# Patient Record
Sex: FEMALE | Race: WHITE
[De-identification: names, ages, dates, MRNs, and addresses within clinical notes are randomized per-mention and may not be internally consistent; named-entity substitution may affect disease eponyms.]

---

## 2017-05-02 ENCOUNTER — HOSPITAL ENCOUNTER (OUTPATIENT)
Dept: HOSPITAL 75 - RAD | Age: 52
End: 2017-05-02
Attending: FAMILY MEDICINE
Payer: COMMERCIAL

## 2017-05-02 DIAGNOSIS — Z12.31: Primary | ICD-10-CM

## 2017-05-02 PROCEDURE — 77067 SCR MAMMO BI INCL CAD: CPT

## 2017-05-03 NOTE — DIAGNOSTIC IMAGING REPORT
EXAMINATION:

Bilateral screening mammogram with a Computer Aided Detection

(CAD) system.



INDICATION: 

Screening.



PERSONAL HISTORY: 

No current complaints stated on the questionnaire.



COMPARISON: 

01/19/2016.



FINDINGS: 

The breasts are composed of scattered fibroglandular densities.

No suspicious calcification or mass is seen. Allowing for

technique and positional differences, no suspicious change is

seen.



IMPRESSION: 

No significant change.



ACR BI-RADS Category 2: Benign findings.

Result letter will be mailed to the patient.

Note: At least 10% of breast cancer is not imaged by mammography.



Dictated by: 



  Dictated on workstation # [id number]

## 2020-08-25 ENCOUNTER — HOSPITAL ENCOUNTER (OUTPATIENT)
Dept: HOSPITAL 75 - CARD | Age: 55
End: 2020-08-25
Attending: INTERNAL MEDICINE
Payer: COMMERCIAL

## 2020-08-25 VITALS — SYSTOLIC BLOOD PRESSURE: 157 MMHG | DIASTOLIC BLOOD PRESSURE: 87 MMHG

## 2020-08-25 VITALS — WEIGHT: 138.89 LBS | BODY MASS INDEX: 24.61 KG/M2 | HEIGHT: 62.99 IN

## 2020-08-25 DIAGNOSIS — Z95.5: ICD-10-CM

## 2020-08-25 DIAGNOSIS — I25.89: ICD-10-CM

## 2020-08-25 DIAGNOSIS — Z72.0: ICD-10-CM

## 2020-08-25 DIAGNOSIS — I65.23: ICD-10-CM

## 2020-08-25 DIAGNOSIS — I25.2: Primary | ICD-10-CM

## 2020-08-25 DIAGNOSIS — I25.10: ICD-10-CM

## 2020-08-25 DIAGNOSIS — I11.9: ICD-10-CM

## 2020-08-25 DIAGNOSIS — E78.5: ICD-10-CM

## 2020-08-25 PROCEDURE — 93017 CV STRESS TEST TRACING ONLY: CPT

## 2020-08-25 PROCEDURE — 78452 HT MUSCLE IMAGE SPECT MULT: CPT

## 2020-08-25 NOTE — STRESS TEST
DATE OF SERVICE:  08/25/2020



RESTING AND POST REGADENOSON TECHNETIUM-99M TETROFOSMIN SPET CT IMAGING



ORDERING PHYSICIAN:

Dr. Parra.



PRIMARY PHYSICIAN:

Dr. Linder.



CLINICAL DIAGNOSIS:

Coronary artery disease.



Baseline images were carried out after injection of 10.09 mCi of technetium-99m

Tetrofosmin.  This was followed by 0.4 mg regadenoson and 32.9 mCi of

technetium-99m Tetrofosmin for stress imaging.  The electrocardiogram showed

sinus rhythm at baseline.  The electrocardiogram did not change significantly

with regadenoson infusion.  The patient noted mild shortness of breath and a

headache following regadenoson infusion, which resolved in a few minutes. 

Review of images at rest and following stress indicates anteroapical perfusion

defect that is mostly fixed.  Gated images show well preserved global left

ventricular systolic function with a calculated ejection fraction of 52%.  There

is localized anteroapical hypokinesis to akinesis.  Left ventricular end

diastolic volume is 55 mL.  TID is absent (0.98).



CONCLUSIONS:

1.  This study indicates an anteroapical infarction with a small amount of

antonio-infarct ischemia.

2.  Localized anteroapical hypokinesis to akinesis.

3.  Well preserved global left ventricular systolic function with a calculated

ejection fraction of 52%.





Job ID: [id number]

DocumentID: [id number]

Dictated Date:  08/25/2020 15:11:26

Transcription Date: 08/25/2020 19:47:55

Dictated By: DANAY PARRA MD, MA, FACP, FACC,

## 2021-10-13 ENCOUNTER — HOSPITAL ENCOUNTER (OUTPATIENT)
Dept: HOSPITAL 75 - RAD | Age: 56
End: 2021-10-13
Attending: FAMILY MEDICINE
Payer: COMMERCIAL

## 2021-10-13 DIAGNOSIS — M79.662: Primary | ICD-10-CM

## 2021-10-13 NOTE — DIAGNOSTIC IMAGING REPORT
CLINICAL INDICATION: Patient with left calf pain.



COMPARISON: None.



PROCEDURE:

Real-time left lower extremity venous Doppler duplex evaluation

is performed from the inguinal region through the popliteal

fossa. The calf venous structures are also evaluated.



FINDINGS:

The deep venous system is well visualized and is easily

compressible.  There is no evidence of deep venous thrombosis,

valvular incompetence, or significant collateral circulation.



IMPRESSION:

There is no ultrasound Doppler evidence of deep venous thrombosis

in the left lower extremity.



Dictated by: 



  Dictated on workstation # [id number]

## 2022-04-19 ENCOUNTER — HOSPITAL ENCOUNTER (OUTPATIENT)
Dept: HOSPITAL 75 - ER | Age: 57
Setting detail: OBSERVATION
LOS: 1 days | Discharge: HOME | End: 2022-04-20
Attending: FAMILY MEDICINE | Admitting: FAMILY MEDICINE
Payer: COMMERCIAL

## 2022-04-19 VITALS — DIASTOLIC BLOOD PRESSURE: 86 MMHG | SYSTOLIC BLOOD PRESSURE: 139 MMHG

## 2022-04-19 VITALS — SYSTOLIC BLOOD PRESSURE: 127 MMHG | DIASTOLIC BLOOD PRESSURE: 82 MMHG

## 2022-04-19 VITALS — HEIGHT: 62.99 IN | WEIGHT: 139.33 LBS | BODY MASS INDEX: 24.69 KG/M2

## 2022-04-19 VITALS — DIASTOLIC BLOOD PRESSURE: 71 MMHG | SYSTOLIC BLOOD PRESSURE: 106 MMHG

## 2022-04-19 VITALS — SYSTOLIC BLOOD PRESSURE: 112 MMHG | DIASTOLIC BLOOD PRESSURE: 73 MMHG

## 2022-04-19 VITALS — DIASTOLIC BLOOD PRESSURE: 88 MMHG | SYSTOLIC BLOOD PRESSURE: 147 MMHG

## 2022-04-19 VITALS — SYSTOLIC BLOOD PRESSURE: 108 MMHG | DIASTOLIC BLOOD PRESSURE: 70 MMHG

## 2022-04-19 VITALS — SYSTOLIC BLOOD PRESSURE: 143 MMHG | DIASTOLIC BLOOD PRESSURE: 90 MMHG

## 2022-04-19 VITALS — DIASTOLIC BLOOD PRESSURE: 72 MMHG | SYSTOLIC BLOOD PRESSURE: 112 MMHG

## 2022-04-19 VITALS — DIASTOLIC BLOOD PRESSURE: 80 MMHG | SYSTOLIC BLOOD PRESSURE: 125 MMHG

## 2022-04-19 VITALS — DIASTOLIC BLOOD PRESSURE: 94 MMHG | SYSTOLIC BLOOD PRESSURE: 151 MMHG

## 2022-04-19 VITALS — DIASTOLIC BLOOD PRESSURE: 87 MMHG | SYSTOLIC BLOOD PRESSURE: 138 MMHG

## 2022-04-19 VITALS — DIASTOLIC BLOOD PRESSURE: 87 MMHG | SYSTOLIC BLOOD PRESSURE: 137 MMHG

## 2022-04-19 DIAGNOSIS — I25.10: ICD-10-CM

## 2022-04-19 DIAGNOSIS — Z79.82: ICD-10-CM

## 2022-04-19 DIAGNOSIS — Z79.899: ICD-10-CM

## 2022-04-19 DIAGNOSIS — R05.9: ICD-10-CM

## 2022-04-19 DIAGNOSIS — I10: ICD-10-CM

## 2022-04-19 DIAGNOSIS — Z95.5: ICD-10-CM

## 2022-04-19 DIAGNOSIS — I21.4: ICD-10-CM

## 2022-04-19 DIAGNOSIS — F41.9: ICD-10-CM

## 2022-04-19 DIAGNOSIS — F17.210: ICD-10-CM

## 2022-04-19 DIAGNOSIS — R07.89: Primary | ICD-10-CM

## 2022-04-19 DIAGNOSIS — I65.29: ICD-10-CM

## 2022-04-19 DIAGNOSIS — Z86.16: ICD-10-CM

## 2022-04-19 DIAGNOSIS — Z79.01: ICD-10-CM

## 2022-04-19 DIAGNOSIS — E78.5: ICD-10-CM

## 2022-04-19 LAB
ALBUMIN SERPL-MCNC: 4.3 GM/DL (ref 3.2–4.5)
ALP SERPL-CCNC: 124 U/L (ref 40–136)
ALT SERPL-CCNC: 18 U/L (ref 0–55)
APTT BLD: 32 SEC (ref 24–35)
BASOPHILS # BLD AUTO: 0 10^3/UL (ref 0–0.1)
BASOPHILS NFR BLD AUTO: 0 % (ref 0–10)
BILIRUB SERPL-MCNC: 0.4 MG/DL (ref 0.1–1)
BUN/CREAT SERPL: 12
CALCIUM SERPL-MCNC: 10 MG/DL (ref 8.5–10.1)
CHLORIDE SERPL-SCNC: 108 MMOL/L (ref 98–107)
CO2 SERPL-SCNC: 20 MMOL/L (ref 21–32)
CREAT SERPL-MCNC: 0.75 MG/DL (ref 0.6–1.3)
EOSINOPHIL # BLD AUTO: 0.2 10^3/UL (ref 0–0.3)
EOSINOPHIL NFR BLD AUTO: 2 % (ref 0–10)
GFR SERPLBLD BASED ON 1.73 SQ M-ARVRAT: 93 ML/MIN
GLUCOSE SERPL-MCNC: 102 MG/DL (ref 70–105)
HCT VFR BLD CALC: 40 % (ref 35–52)
HGB BLD-MCNC: 13.6 G/DL (ref 11.5–16)
INR PPP: 0.9 (ref 0.8–1.4)
LYMPHOCYTES # BLD AUTO: 1.9 10^3/UL (ref 1–4)
LYMPHOCYTES NFR BLD AUTO: 28 % (ref 12–44)
MAGNESIUM SERPL-MCNC: 2 MG/DL (ref 1.6–2.4)
MANUAL DIFFERENTIAL PERFORMED BLD QL: NO
MCH RBC QN AUTO: 33 PG (ref 25–34)
MCHC RBC AUTO-ENTMCNC: 34 G/DL (ref 32–36)
MCV RBC AUTO: 98 FL (ref 80–99)
MONOCYTES # BLD AUTO: 0.6 10^3/UL (ref 0–1)
MONOCYTES NFR BLD AUTO: 9 % (ref 0–12)
NEUTROPHILS # BLD AUTO: 4.2 10^3/UL (ref 1.8–7.8)
NEUTROPHILS NFR BLD AUTO: 61 % (ref 42–75)
PLATELET # BLD: 279 10^3/UL (ref 130–400)
PMV BLD AUTO: 9.6 FL (ref 9–12.2)
POTASSIUM SERPL-SCNC: 4.1 MMOL/L (ref 3.6–5)
PROT SERPL-MCNC: 7.5 GM/DL (ref 6.4–8.2)
PROTHROMBIN TIME: 12.6 SEC (ref 12.2–14.7)
SODIUM SERPL-SCNC: 142 MMOL/L (ref 135–145)
WBC # BLD AUTO: 7 10^3/UL (ref 4.3–11)

## 2022-04-19 PROCEDURE — 85025 COMPLETE CBC W/AUTO DIFF WBC: CPT

## 2022-04-19 PROCEDURE — 36415 COLL VENOUS BLD VENIPUNCTURE: CPT

## 2022-04-19 PROCEDURE — 93041 RHYTHM ECG TRACING: CPT

## 2022-04-19 PROCEDURE — 71275 CT ANGIOGRAPHY CHEST: CPT

## 2022-04-19 PROCEDURE — 96375 TX/PRO/DX INJ NEW DRUG ADDON: CPT

## 2022-04-19 PROCEDURE — 96361 HYDRATE IV INFUSION ADD-ON: CPT

## 2022-04-19 PROCEDURE — 85610 PROTHROMBIN TIME: CPT

## 2022-04-19 PROCEDURE — 84484 ASSAY OF TROPONIN QUANT: CPT

## 2022-04-19 PROCEDURE — 99284 EMERGENCY DEPT VISIT MOD MDM: CPT

## 2022-04-19 PROCEDURE — 80061 LIPID PANEL: CPT

## 2022-04-19 PROCEDURE — 80053 COMPREHEN METABOLIC PANEL: CPT

## 2022-04-19 PROCEDURE — 93005 ELECTROCARDIOGRAM TRACING: CPT

## 2022-04-19 PROCEDURE — 71045 X-RAY EXAM CHEST 1 VIEW: CPT

## 2022-04-19 PROCEDURE — 93306 TTE W/DOPPLER COMPLETE: CPT

## 2022-04-19 PROCEDURE — 85730 THROMBOPLASTIN TIME PARTIAL: CPT

## 2022-04-19 PROCEDURE — 83735 ASSAY OF MAGNESIUM: CPT

## 2022-04-19 PROCEDURE — 96374 THER/PROPH/DIAG INJ IV PUSH: CPT

## 2022-04-19 PROCEDURE — 85379 FIBRIN DEGRADATION QUANT: CPT

## 2022-04-19 PROCEDURE — 96360 HYDRATION IV INFUSION INIT: CPT

## 2022-04-19 PROCEDURE — 83880 ASSAY OF NATRIURETIC PEPTIDE: CPT

## 2022-04-19 PROCEDURE — 83874 ASSAY OF MYOGLOBIN: CPT

## 2022-04-19 PROCEDURE — 96376 TX/PRO/DX INJ SAME DRUG ADON: CPT

## 2022-04-19 RX ADMIN — SODIUM CHLORIDE, SODIUM LACTATE, POTASSIUM CHLORIDE, AND CALCIUM CHLORIDE SCH MLS/HR: 600; 310; 30; 20 INJECTION, SOLUTION INTRAVENOUS at 17:37

## 2022-04-19 NOTE — DIAGNOSTIC IMAGING REPORT
INDICATION: Chest pain.



COMPARISON: 11/05/2019.



FINDINGS: Single frontal view of the chest demonstrates normal

heart size and pulmonary vascularity. The lungs are well aerated

and clear. No large pleural effusion or pneumothorax is seen. The

visualized osseous structures show no acute abnormalities.



IMPRESSION: 

1. No acute cardiopulmonary process.  



Dictated by: 



  Dictated on workstation # [id number]

## 2022-04-19 NOTE — CONSULTATION-CARDIOLOGY
HPI-Cardiology


Cardiology Consultation:


Date of Consultation


22


Time Seen by a Provider:  16:20


Date of Admission





Attending Physician


Hayley Linder DO


Admitting Physician


Hayley Linder DO


Consulting Physician


DANAY PARDO MD, MA, FACP, FACC, FSCAI, CCDS





HPI:


Chief Complaint:


Chest pain


Ms. Bravo is a 57 yr old female who is being admitted to [facility] from the ED with 

c/o CP.  She reports this morning she leaned over in her chair to  a 

cheese stick off the floor at work and when she sat back up she had localized, 

left sided chest chest pressure.  No c/o SOB, diaphoresis, palpitations, syncope

or near syncope.  She reports the discomfort has been constant all day.  Nothing

makes it worse.  She reports she received one nitro sublingual which had very 

little, if any, change in the discomfort.  She reports she has been compliant 

with her medications.  No c/o LE swelling.  No c/o n/v/d.





Review of Systems-Cardiology


Review of Systems


Constitutional:  No chills, No fever, No lightheadedness, No malaise


Eyes:  No vision change


Ears/Nose/Throat:  No epistaxis, No recent hearing loss


Respiratory:  As described under HPI


Cardiovascular:  As described under HPI


Gastrointestinal:  As described under HPI


Genitourinary:  No dysuria, No hematuria


Musculoskeletal:  no symptoms reported


Skin:  No rash on exposed areas, No ulcerations on exposed areas


Psychiatric/Neurological:  No anxiety, No depression, No seizure, No focal 

weakness, No syncope


Hematologic:  No bleeding abnormalities





PMH-Social-Family Hx


Patient Social History


Smoking Status:  Current Everyday Smoker


2nd Hand Smoke Exposure:  Yes


Have you traveled recently?:  No


Alcohol Use?:  Yes


Pt feels they are or have been:  No


Tobacco type used:  Cigarettes





Past Medical History


PMH


As described under Assessment.





Family Medical History


Family Medical History:  


Reported family h/o mother having CAD, diagnosed in her 50's.  She reports


a brother with a-fib.  She reports a sister with HTN.


Family History:  


Cardiovascular disease


  19 MOTHER, Onset:50's - 60


FH: atrial fibrillation


  G8 BROTHER, Onset:Unknown


Glaucoma


Hypercholesterolemia


  G8 SISTER, Onset:Unknown


Hypertension


  G8 SISTER, Onset:Unknown


Kidney disease


  19 FATHER, , Onset:60 years & older





Allergies and Home Medications


Allergies


Coded Allergies:  


     Sulfa (Sulfonamide Antibiotics) (Unverified  Allergy, Unknown, 1/14/15)





Patient Home Medication List


Home Medication List Reviewed:  Yes


Aspirin (Aspirin) 81 Mg Tab.chew, 81 MG PO DAILY


   Prescribed by: HAYLEY LINDER on 19 1232


Atorvastatin Calcium (Atorvastatin Calcium) 80 Mg Tablet, 80 MG PO HS


   Prescribed by: HAYLEY LINDER on 19 1232


Cetirizine HCl (Zyrtec) 10 Mg Tablet, 10 MG PO DAILY PRN for ALLERGIES, 

(Reported)


   Entered as Reported by: NIDHI WADDELL on 19 1030


Clopidogrel Bisulfate (Clopidogrel) 75 Mg Tablet, 75 MG PO DAILY


   Prescribed by: HAYLEY LINDER on 19 1232


Famotidine (Famotidine) 20 Mg Tablet, 20 MG PO BID


   Prescribed by: HAYLEY LINDER on 19 1232


Metoprolol Succinate (Metoprolol Succinate) 50 Mg Tab.er.24h, 50 MG PO DAILY


   Prescribed by: HAYLEY LINDER on 19 1232


Nitrofurantoin Monohyd/M-Cryst (Macrobid 100 mg Capsule) 100 Mg Capsule, 1 TAB 

PO BID


   Prescribed by: FERN HERRERA on 19 1624





Physical Exam-Cardiology


Physical Exam


Vital Signs/I&O











 22





 13:13 15:59 16:05


 


Temp 36.9  36.4


 


Pulse 67 56 59


 


Resp 18 18 18


 


B/P (MAP) 147/89 (108) 128/73 138/87 (104)


 


Pulse Ox 97 98 98


 


O2 Delivery   Room Air





Capillary Refill : Less Than 3 Seconds


Constitutional:  AAO x 3, well-developed, well-nourished


HEENT:  PERRL, hearing is well preserved, oral hygience is good


Neck:  No carotid bruit; carotid pulses are 2 + bilaterally


Respiratory:  No accessory muscle use, No respiratory distress; chest expansion 

is symmetric, chest is bilaterally symmetric, lungs clear to auscultation


Cardiovascular:  regular rate-rhythm; No JVD; S1 and S2


Gastrointestinal:  No tender; soft, round


Extremities:  no lower extremity edema bilateral


Neurologic/Psychiatric:  grossly intact (moves all extremities)


Skin:  No rash on exposed areas, No ulcerations on exposed areas





Data Review


Labs


Laboratory Tests


22 13:39: 


White Blood Count 7.0, Red Blood Count 4.10, Hemoglobin 13.6, Hematocrit 40, 

Mean Corpuscular Volume 98, Mean Corpuscular Hemoglobin 33, Mean Corpuscular 

Hemoglobin Concent 34, Red Cell Distribution Width 12.4, Platelet Count 279, 

Mean Platelet Volume 9.6, Immature Granulocyte % (Auto) 0, Neutrophils (%) 

(Auto) 61, Lymphocytes (%) (Auto) 28, Monocytes (%) (Auto) 9, Eosinophils (%) 

(Auto) 2, Basophils (%) (Auto) 0, Neutrophils # (Auto) 4.2, Lymphocytes # (Auto)

1.9, Monocytes # (Auto) 0.6, Eosinophils # (Auto) 0.2, Basophils # (Auto) 0.0, 

Immature Granulocyte # (Auto) 0.0, Prothrombin Time 12.6, INR Comment 0.9, 

Activated Partial Thromboplast Time 32, D-Dimer 1.73H, Sodium Level 142, 

Potassium Level 4.1, Chloride Level 108H, Carbon Dioxide Level 20L, Anion Gap 

14, Blood Urea Nitrogen 9, Creatinine 0.75, Estimat Glomerular Filtration Rate 

93, BUN/Creatinine Ratio 12, Glucose Level 102, Calcium Level 10.0, Corrected 

Calcium 9.8, Magnesium Level 2.0, Total Bilirubin 0.4, Aspartate Amino Transf 

(AST/SGOT) 22, Alanine Aminotransferase (ALT/SGPT) 18, Alkaline Phosphatase 124,

Myoglobin 20.2, Troponin I < 0.028, B-Type Natriuretic Peptide 124.1H, Total 

Protein 7.5, Albumin 4.3








A/P-Cardiology


Assessment/Admission Diagnosis


Chest pain 


- undetermined etiology





CAD.


- Ac NSTEMI, treated with primary PCI in the late hours of 19.


- Card cath and PCI of 19: Coronary artery disease consisting of proximal 

occlusion of a small caliber high diagonal branch of the left anterior 

descending to which successful balloon angioplasty was carried out with Emerge 

1.5 x 8 mm balloon and which restored normal antegrade flow. The left anterior 

descending had 70% mid vessel stenosis to which successful stenting was carried 

out with [place] 2.0 x 15 mm stent with good results. The rest of the coronary 

vessels have mild plaques. Elevated left ventricular end-diastolic pressure. 

Well preserved global left ventricular systolic function with an ejection 

fraction of approximately 60%.


- MPI of Aug 25, 2020 indicated an anteroapical infarction with a small amt of p

alyssa-infarct ischemia which fits with her known CAD, no c/o CP at this time, 

localized anteroapical hypokinesis to akinesis. LVEF 52%





Chronic tobacco use


- cessation advised





Strong fam h/o premature CAD





Hypertension





Hyperlipidemia


- tansaminase elevation with doses higher than atorvastatin 40 mg daily) - 

followed by Dr. Linder





Carotid dz


- minimal on carotid u/s of 2020





Discussion and Recomendations


Chest pain of undetermined etiology


- no evidence of ACS thus far


- continue serial troponins


- MPI tomorrow d/t c/o and known h/o CAD with previous stent placement


- continue Plavix and ASA


Continue home medications


Monitor lab


- replace electrolytes as indicated


Add PPI


Further recs will be based on her hospital course


We would like to thank medical services for this consult





Clinical Quality Measures


AMI/AHF:


ASA po Prior to arrival:  Yes (81MG ASPIRIN AT HOME)











DANAY PARDO MD FACP FACC CCDS   2022 16:37

## 2022-04-19 NOTE — DIAGNOSTIC IMAGING REPORT
EXAMINATION: CT angiography of the chest.



TECHNIQUE: Contrast enhanced thin section helical images were

obtained through the chest with intravenous contrast timed for

the optimal opacification of the arterial structures per CTA

protocol. Post-processing, reconstructions and interpretation of

angiographic images of the vessels was performed. 3D MIP

reconstructions were performed and reviewed. All CT scans use one

or more of the following dose optimizing techniques: Automated

exposure control, MA and/or KvP adjustment based on a patient

size and exam type, or iterative reconstruction. 



HISTORY: Chest pain and elevated D-dimer.



COMPARISON: None available.



FINDINGS: 



There is no pulmonary embolism.



There is no edema or pneumonia. No pleural effusion. No

pneumothorax. No suspicious nodules.



There is no axillary or supraclavicular lymphadenopathy. There is

no mediastinal lymphadenopathy. Heart size is normal. There are

mild coronary artery calcifications. No pericardial effusion.

Aorta is normal in caliber.



Limited views of the upper abdomen are unremarkable.



There are no suspicious osseous lesions.



IMPRESSION:



1. No pulmonary embolism, clear lungs.



Dictated by: 



  Dictated on workstation # [id number]

## 2022-04-19 NOTE — CONSULTATION-CARDIOLOGY
HPI-Cardiology


Cardiology Consultation:


Date of Consultation


22


Time Seen by a Provider:  15:30


Date of Admission


22


Attending Physician





Admitting Physician


Hayley Linder DO


Consulting Physician


Faye Parra MD





HPI:


Chief Complaint:


Chest pain


Ms. Bravo is a 57 yr old female who is being admitted to [facility] from the ED with 

c/o CP.  She reports this morning she leaned over in her chair to  a 

cheese stick off the floor at work and when she sat back up she had localized, 

left sided chest chest pressure.  No c/o SOB, diaphoresis, palpitations, syncope

or near syncope.  She reports the discomfort has been constant all day.  Nothing

makes it worse.  She reports she received one nitro sublingual which had very 

little, if any, change in the discomfort.  She reports she has been compliant 

with her medications.  No c/o LE swelling.  No c/o n/v/d.





Review of Systems-Cardiology


Review of Systems


Constitutional:  No chills, No fever, No lightheadedness, No malaise


Eyes:  No vision change


Ears/Nose/Throat:  No epistaxis, No recent hearing loss


Respiratory:  As described under HPI


Cardiovascular:  As described under HPI


Gastrointestinal:  As described under HPI


Genitourinary:  No dysuria, No hematuria


Musculoskeletal:  no symptoms reported


Skin:  No rash on exposed areas, No ulcerations on exposed areas


Psychiatric/Neurological:  No anxiety, No depression, No seizure, No focal 

weakness, No syncope


Hematologic:  No bleeding abnormalities





PMH-Social-Family Hx


Patient Social History


Smoking Status:  Current Everyday Smoker


2nd Hand Smoke Exposure:  Yes


Have you traveled recently?:  No


Alcohol Use?:  Yes


Pt feels they are or have been:  No


Tobacco type used:  Cigarettes





Past Medical History


PMH


As described under Assessment.





Family Medical History


Family Medical History:  


Reported family h/o mother having CAD, diagnosed in her 50's.  She reports


a brother with a-fib.  She reports a sister with HTN.


Family History:  


19 FATHER, 


  Kidney disease, Onset:60 years & older


19 MOTHER


  Cardiovascular disease, Onset:50's - 60


G8 BROTHER


  FH: atrial fibrillation, Onset:Unknown


G8 SISTER


  Hypercholesterolemia, Onset:Unknown


  Hypertension, Onset:Unknown


Relation not specified for:


  Glaucoma





Allergies and Home Medications


Allergies


Coded Allergies:  


     Sulfa (Sulfonamide Antibiotics) (Unverified  Allergy, Unknown, 1/14/15)


     nitrofurantoin (Verified  Allergy, Unknown, 22)


   rash over torso





Patient Home Medication List


Amlodipine Besylate (Norvasc) 5 Mg Tablet, 5 MG PO DAILY, (Reported)


   Entered as Reported by: Jane Alejandre on 22


   Last Action: Reviewed


Aspirin (Aspirin) 81 Mg Tab.chew, 81 MG PO DAILY


   Prescribed by: HAYLEY LINDER on 19 123


   Last Action: Reviewed


Atorvastatin Calcium (Atorvastatin Calcium) 80 Mg Tablet, 80 MG PO HS


   Prescribed by: HAYLEY LINDER on 19 123


   Last Action: Continued


Cetirizine HCl (Zyrtec) 10 Mg Tablet, 10 MG PO DAILY PRN for ALLERGIES, 

(Reported)


   Entered as Reported by: NIDHI WADDELL on 19 1030


   Last Action: Reviewed


Clopidogrel Bisulfate (Clopidogrel) 75 Mg Tablet, 75 MG PO DAILY


   Prescribed by: HAYLEY LINDER on 19 123


   Last Action: Reviewed


Famotidine (Famotidine) 20 Mg Tablet, 20 MG PO BID


   Prescribed by: HAYLEY LINDER on 19 123


   Last Action: Reviewed


Metoprolol Succinate (Metoprolol Succinate) 50 Mg Tab.er.24h, 50 MG PO DAILY


   Prescribed by: HAYLEY LINDER on 19 123


   Last Action: Continued


Paroxetine HCl (Paroxetine HCl) 20 Mg Tablet, 20 MG PO DAILY, (Reported)


   Entered as Reported by: Jane Alejandre on 22 163


   Last Action: Continued


Discontinued Medications


Nitrofurantoin Monohyd/M-Cryst (Macrobid 100 mg Capsule) 100 Mg Capsule, 1 TAB 

PO BID


   Discontinued Reason: No Longer Taking


   Prescribed by: FERN HERRERA on 19 162


   Last Action: Discontinued





Physical Exam-Cardiology


Physical Exam


Vital Signs/I&O











 22





 22:05 23:10 01:08 03:25


 


Temp  36.5  36.3


 


Pulse 64 69 61 53


 


Resp  20  20


 


B/P (MAP) 106/71 (83) 108/70 (83)  113/69 (84)


 


Pulse Ox  95  96


 


O2 Delivery  Room Air  Room Air


 


    





 22 





 07:00 08:02 08:15 


 


Pulse 61 55  


 


Resp  16  


 


B/P (MAP)  129/80 (96)  


 


Pulse Ox  97  


 


O2 Delivery  Room Air Room Air 














 22





 00:00


 


Intake Total 200 ml


 


Balance 200 ml





Capillary Refill : Less Than 3 Seconds


Constitutional:  AAO x 3, well-developed, well-nourished


HEENT:  PERRL, hearing is well preserved, oral hygience is good


Neck:  No carotid bruit; carotid pulses are 2 + bilaterally


Respiratory:  No accessory muscle use, No respiratory distress; chest expansion 

is symmetric, chest is bilaterally symmetric, lungs clear to auscultation


Cardiovascular:  regular rate-rhythm; No JVD; S1 and S2


Gastrointestinal:  No tender; soft, round


Extremities:  no lower extremity edema bilateral


Neurologic/Psychiatric:  grossly intact (moves all extremities)


Skin:  No rash on exposed areas, No ulcerations on exposed areas





Data Review


Labs


Laboratory Tests


22 13:39: 


White Blood Count 7.0, Red Blood Count 4.10, Hemoglobin 13.6, Hematocrit 40, 

Mean Corpuscular Volume 98, Mean Corpuscular Hemoglobin 33, Mean Corpuscular 

Hemoglobin Concent 34, Red Cell Distribution Width 12.4, Platelet Count 279, 

Mean Platelet Volume 9.6, Immature Granulocyte % (Auto) 0, Neutrophils (%) 

(Auto) 61, Lymphocytes (%) (Auto) 28, Monocytes (%) (Auto) 9, Eosinophils (%) 

(Auto) 2, Basophils (%) (Auto) 0, Neutrophils # (Auto) 4.2, Lymphocytes # (Auto)

1.9, Monocytes # (Auto) 0.6, Eosinophils # (Auto) 0.2, Basophils # (Auto) 0.0, 

Immature Granulocyte # (Auto) 0.0, Prothrombin Time 12.6, INR Comment 0.9, 

Activated Partial Thromboplast Time 32, D-Dimer 1.73H, Sodium Level 142, 

Potassium Level 4.1, Chloride Level 108H, Carbon Dioxide Level 20L, Anion Gap 

14, Blood Urea Nitrogen 9, Creatinine 0.75, Estimat Glomerular Filtration Rate 

93, BUN/Creatinine Ratio 12, Glucose Level 102, Calcium Level 10.0, Corrected 

Calcium 9.8, Magnesium Level 2.0, Total Bilirubin 0.4, Aspartate Amino Transf 

(AST/SGOT) 22, Alanine Aminotransferase (ALT/SGPT) 18, Alkaline Phosphatase 124,

Myoglobin 20.2, Troponin I < 0.028, B-Type Natriuretic Peptide 124.1H, Total 

Protein 7.5, Albumin 4.3


22 17:18: Troponin I < 0.028


22 05:26: 


Troponin I < 0.028, Triglycerides Level 149, Cholesterol Level 178, LDL 

Cholesterol Direct 122, VLDL Cholesterol 30, HDL Cholesterol 36L








Radiology


NAME:   ANIBAL BRAVO


[facility] REC#:   [id number]


ACCOUNT#:   [id number]


PT STATUS:   REG ER


:   1965


PHYSICIAN:   JOSE MASTERSON


ADMIT DATE:   22/ER


***Draft***


Date of Exam:22





CHEST 1 VIEW, AP/PA ONLY








INDICATION: Chest pain.





COMPARISON: 2019.





FINDINGS: Single frontal view of the chest demonstrates normal


heart size and pulmonary vascularity. The lungs are well aerated


and clear. No large pleural effusion or pneumothorax is seen. The


visualized osseous structures show no acute abnormalities.





IMPRESSION: 


1. No acute cardiopulmonary process.  





  Dictated on workstation # [id number]








Dict:   22 [id number]


Trans:   22 1417


 [id number]





Interpreted by:     JACQUELIN LUIS MD


Electronically signed by:  


NAME:   ANIBAL BRAVO


[facility] REC#:   [id number]


ACCOUNT#:   [id number]


PT STATUS:   REG ER


:   1965


PHYSICIAN:   JOSE MASTERSON


ADMIT DATE:   22/ER


***Draft***


Date of Exam:22





CT ANGIO CHEST W








EXAMINATION: CT angiography of the chest.





TECHNIQUE: Contrast enhanced thin section helical images were


obtained through the chest with intravenous contrast timed for


the optimal opacification of the arterial structures per CTA


protocol. Post-processing, reconstructions and interpretation of


angiographic images of the vessels was performed. 3D MIP


reconstructions were performed and reviewed. All CT scans use one


or more of the following dose optimizing techniques: Automated


exposure control, MA and/or KvP adjustment based on a patient


size and exam type, or iterative reconstruction. 





HISTORY: Chest pain and elevated D-dimer.





COMPARISON: None available.





FINDINGS: 





There is no pulmonary embolism.





There is no edema or pneumonia. No pleural effusion. No


pneumothorax. No suspicious nodules.





There is no axillary or supraclavicular lymphadenopathy. There is


no mediastinal lymphadenopathy. Heart size is normal. There are


mild coronary artery calcifications. No pericardial effusion.


Aorta is normal in caliber.





Limited views of the upper abdomen are unremarkable.





There are no suspicious osseous lesions.





IMPRESSION:





1. No pulmonary embolism, clear lungs.





  Dictated on workstation # [id number]








Dict:   22 1449


Trans:   22 1453


 [id number]





Interpreted by:     MARCELO IVORY MD


Electronically signed by:





ECG Impression


ECG


Initial ECG Rhythm:  Normal Sinus





A/P-Cardiology


Assessment/Admission Diagnosis


Chest pain 


- undetermined etiology





CAD.


- Ac NSTEMI, treated with primary PCI in the late hours of 19.


- Card cath and PCI of 19: Coronary artery disease consisting of proximal 

occlusion of a small caliber high diagonal branch of the left anterior sussy

cending to which successful balloon angioplasty was carried out with Emerge 1.5 

x 8 mm balloon and which restored normal antegrade flow. The left anterior 

descending had 70% mid vessel stenosis to which successful stenting was carried 

out with [place] 2.0 x 15 mm stent with good results. The rest of the coronary 

vessels have mild plaques. Elevated left ventricular end-diastolic pressure. 

Well preserved global left ventricular systolic function with an ejection 

fraction of approximately 60%.


- MPI of Aug 25, 2020 indicated an anteroapical infarction with a small amt of 

antonio-infarct ischemia which fits with her known CAD, no c/o CP at this time, 

localized anteroapical hypokinesis to akinesis. LVEF 52%





Chronic tobacco use


- cessation advised





Strong fam h/o premature CAD





Hypertension





Hyperlipidemia


- tansaminase elevation with doses higher than atorvastatin 40 mg daily) - 

followed by Dr. Linder





Carotid dz


- minimal on carotid u/s of 2020





Discussion and Recomendations


Chest pain of undetermined etiology


- no evidence of ACS thus far


- continue serial troponins


- MPI tomorrow d/t c/o and known h/o CAD with previous stent placement


- continue Plavix and ASA


Continue home medications


Monitor lab


- replace electrolytes as indicated


Add PPI


Further recs will be based on her hospital course


We would like to thank medical services for this consult





Clinical Quality Measures


AMI/AHF:


ASA po Prior to arrival:  Yes (81MG ASPIRIN AT HOME)











JED ZAMBRANO           2022 15:21

## 2022-04-19 NOTE — ED CHEST PAIN
General


Chief Complaint:  Chest Pain


Stated Complaint:  CP


Source:  patient


Exam Limitations:  no limitations





History of Present Illness


Date Seen by Provider:  2022


Time Seen by Provider:  13:33


Initial Comments


PT ARRIVED BY PRIVATE VEHICLE WITH CHIEF COMPLAINT OF CHEST PAIN. PT WAS ALERT, 

ORIENTED X 4 AND AMBULATORY ON ARRIVAL. PT STATED ONSET WAS 1230 WHEN SHE WAS 

BENDING DOWN TO GET SOMETHING AND IT STARTED. NOTHING MAKES IT BETTER OR WORSE. 

PT HAS TAKEN 81 MG OF ASPIRIN. PT STATED PAIN IS STEADY ON LEFT SIDE OF CHEST 

WITH A SEVERITY OF 3/10 ON PAIN SCALE. PT DENIES ANY RADIATION TO BACK, JAW OR 

ARM. PT STATED SHE HAS HISTORY OF A HEART ATTACK AND PREVIOUSLY HAD A STENT 

PLACED. PT TAKES PLAVIX EVERY OTHER DAY. PT HAD COVID IN 2022 AND HAS HAD A 

COUGH SINCE, BUT NO NEW COUGH OR PRODUCTION. PT DENIES FEVER, CHILLS, OR SOB.


Location Injury Occurred:  LEFT SIDE CHEST PAIN


Timing/Duration:  1 hour


Severity/Quality:  mild


Location:  central


Radiation:  no radiation


Activities at Onset:  other (BENDING OVER TO GRAB SOMETHING UNDERNEATH HER DESK)


Prior CP/Workup:  cardiac cath, heart attack


ASA po PTA:  Yes (81MG ASPIRIN AT HOME)


NTG SL PTA:  No


Associated Symptoms:  No fever/chills, No shortness of breath





Allergies and Home Medications


Allergies


Coded Allergies:  


     Sulfa (Sulfonamide Antibiotics) (Unverified  Allergy, Unknown, 1/14/15)





Patient Home Medication List


Home Medication List Reviewed:  Yes


Aspirin (Aspirin) 81 Mg Tab.chew, 81 MG PO DAILY


   Prescribed by: HAYLEY HICKEY on 19 1232


Atorvastatin Calcium (Atorvastatin Calcium) 80 Mg Tablet, 80 MG PO HS


   Prescribed by: HAYLEY HICKEY on 19 1232


Cetirizine HCl (Zyrtec) 10 Mg Tablet, 10 MG PO DAILY PRN for ALLERGIES, 

(Reported)


   Entered as Reported by: NIDHI WADDELL on 19 1030


Clopidogrel Bisulfate (Clopidogrel) 75 Mg Tablet, 75 MG PO DAILY


   Prescribed by: HAYLEY HICKEY on 19 1232


Famotidine (Famotidine) 20 Mg Tablet, 20 MG PO BID


   Prescribed by: HAYLEY HICKEY on 19 1232


Metoprolol Succinate (Metoprolol Succinate) 50 Mg Tab.er.24h, 50 MG PO DAILY


   Prescribed by: HAYLEY HICKEY on 19 1232


Nitrofurantoin Monohyd/M-Cryst (Macrobid 100 mg Capsule) 100 Mg Capsule, 1 TAB 

PO BID


   Prescribed by: FERN HERRERA on 19 1624





Review of Systems


Review of Systems


Constitutional:  see HPI; No fever


EENTM:  No Symptoms Reported, See HPI


Respiratory:  See HPI, Cough


Cardiovascular:  See HPI, Chest Pain


Gastrointestinal:  No Symptoms Reported, See HPI


Genitourinary:  No Symptoms Reported, See HPI


Musculoskeletal:  no symptoms reported, see HPI


Skin:  no symptoms reported, see HPI


Psychiatric/Neurological:  No Symptoms Reported, See HPI


Endocrine:  No Symptoms Reported, See HPI


Hematologic/Lymphatic:  No Symptoms Reported, See HPI





Past Medical-Social-Family Hx


Seasonal Allergies


Seasonal Allergies:  Yes





Past Medical History


Surgeries:  Yes


Appendectomy,  Section, Hysterectomy, Oophorectomy, Orthopedic, 

Tonsillectomy


Respiratory:  No


Cardiac:  Yes


High Cholesterol, Hypertension


Neurological:  No


GYN History:  Hysterectomy


Gastrointestinal:  Yes (COLONOSCOPY )


Polyps


Musculoskeletal:  No


Endocrine:  No


Cancer:  No


Psychosocial:  Yes


Anxiety


Integumentary:  No


Blood Disorders:  No





Family Medical History





Cardiovascular disease


  19 MOTHER, Onset:50's - 60


FH: atrial fibrillation


  G8 BROTHER, Onset:Unknown


Glaucoma


Hypercholesterolemia


  G8 SISTER, Onset:Unknown


Hypertension


  G8 SISTER, Onset:Unknown


Kidney disease


  19 FATHER, , Onset:60 years & older





Physical Exam


Vital Signs





Vital Signs - First Documented








 22





 13:13


 


Temp 36.9


 


Pulse 67


 


Resp 18


 


B/P (MAP) 147/89 (108)


 


Pulse Ox 97





Capillary Refill :


Height, Weight, BMI


Height: 5'3"


Weight: 140lbs. oz. 63.799551ay; 24.60 BMI


Method:Stated


General Appearance:  No Apparent Distress


Neck:  Full Range of Motion, Normal Inspection


Respiratory:  Lungs Clear, Normal Breath Sounds, No Accessory Muscle Use


Cardiovascular:  Regular Rate, Rhythm, No Gallop, No Murmur


Gastrointestinal:  Normal Bowel Sounds


Extremity:  Normal Capillary Refill, Normal Range of Motion


Neurologic/Psychiatric:  Alert, Oriented x3, Normal Mood/Affect


Skin:  Normal Color, Warm/Dry





Progress/Results/Core Measures


Results/Orders


Lab Results





Laboratory Tests








Test


 22


13:39 Range/Units


 


 


White Blood Count


 7.0 


 4.3-11.0


10^3/uL


 


Red Blood Count


 4.10 


 3.80-5.11


10^6/uL


 


Hemoglobin 13.6  11.5-16.0  g/dL


 


Hematocrit 40  35-52  %


 


Mean Corpuscular Volume 98  80-99  fL


 


Mean Corpuscular Hemoglobin 33  25-34  pg


 


Mean Corpuscular Hemoglobin


Concent 34 


 32-36  g/dL





 


Red Cell Distribution Width 12.4  10.0-14.5  %


 


Platelet Count


 279 


 130-400


10^3/uL


 


Mean Platelet Volume 9.6  9.0-12.2  fL


 


Immature Granulocyte % (Auto) 0   %


 


Neutrophils (%) (Auto) 61  42-75  %


 


Lymphocytes (%) (Auto) 28  12-44  %


 


Monocytes (%) (Auto) 9  0-12  %


 


Eosinophils (%) (Auto) 2  0-10  %


 


Basophils (%) (Auto) 0  0-10  %


 


Neutrophils # (Auto)


 4.2 


 1.8-7.8


10^3/uL


 


Lymphocytes # (Auto)


 1.9 


 1.0-4.0


10^3/uL


 


Monocytes # (Auto)


 0.6 


 0.0-1.0


10^3/uL


 


Eosinophils # (Auto)


 0.2 


 0.0-0.3


10^3/uL


 


Basophils # (Auto)


 0.0 


 0.0-0.1


10^3/uL


 


Immature Granulocyte # (Auto)


 0.0 


 0.0-0.1


10^3/uL


 


Prothrombin Time 12.6  12.2-14.7  SEC


 


INR Comment 0.9  0.8-1.4  


 


Activated Partial


Thromboplast Time 32 


 24-35  SEC





 


D-Dimer


 1.73 H


 0.00-0.49


UG/ML


 


Sodium Level 142  135-145  MMOL/L


 


Potassium Level 4.1  3.6-5.0  MMOL/L


 


Chloride Level 108 H   MMOL/L


 


Carbon Dioxide Level 20 L 21-32  MMOL/L


 


Anion Gap 14  5-14  MMOL/L


 


Blood Urea Nitrogen 9  7-18  MG/DL


 


Creatinine


 0.75 


 0.60-1.30


MG/DL


 


Estimat Glomerular Filtration


Rate 93 


  





 


BUN/Creatinine Ratio 12   


 


Glucose Level 102    MG/DL


 


Calcium Level 10.0  8.5-10.1  MG/DL


 


Corrected Calcium 9.8  8.5-10.1  MG/DL


 


Magnesium Level 2.0  1.6-2.4  MG/DL


 


Total Bilirubin 0.4  0.1-1.0  MG/DL


 


Aspartate Amino Transf


(AST/SGOT) 22 


 5-34  U/L





 


Alanine Aminotransferase


(ALT/SGPT) 18 


 0-55  U/L





 


Alkaline Phosphatase 124    U/L


 


Myoglobin


 20.2 


 10.0-92.0


NG/ML


 


Troponin I < 0.028  <0.028  NG/ML


 


B-Type Natriuretic Peptide 124.1 H <100.0  PG/ML


 


Total Protein 7.5  6.4-8.2  GM/DL


 


Albumin 4.3  3.2-4.5  GM/DL








My Orders





Orders - JOSE MASTERSON APRN


Cbc With Automated Diff (22 13:17)


Magnesium (22 13:17)


Chest 1 View, Ap/Pa Only (22 13:17)


Ekg Tracing (22 13:17)


Comprehensive Metabolic Panel (22 13:17)


Myoglobin Serum (22 13:17)


Protime With Inr (22 13:17)


Partial Thromboplastin Time (22 13:17)


O2 (22 13:17)


Monitor-Rhythm Ecg Trace Only (22 13:17)


Lipid Panel (22 06:00)


Ed Iv/Invasive Line Start (22 13:17)


Bnp Rox (22 13:17)


Fibrin Degradation Products (22 13:17)


Troponin I [place] (22 13:17)


Aspirin Chewable Tablet (Baby Aspirin Ch (22 13:30)


Nitroglycerin 0.4 Mg Btl 25's (Nitrostat (22 13:42)


Ct Angio Chest W (22 14:25)


Iohexol Injection (Omnipaque 350 Mg/Ml 1 (22 14:45)


Received Contrast (Hold Metformin- Contr (22 14:45)


Sodium Chloride Flush (Catheter Flush Sy (22 14:45)


Ns (Ivpb) (Sodium Chloride 0.9% Ivpb Bag (22 14:45)





Medications Given in ED





Current Medications








 Medications  Dose


 Ordered  Sig/Nik


 Route  Start Time


 Stop Time Status Last Admin


Dose Admin


 


 Aspirin  324 mg  ONCE  ONCE


 PO  22 13:30


 22 13:31 DC 22 13:43


324 MG


 


 Iohexol  75 ml  ONCE  ONCE


 IV  22 14:45


 22 14:46 DC 22 14:45


66 ML


 


 Nitroglycerin  0.4 mg  STK-MED ONCE


 SL  22 13:42


 22 13:46 DC 22 13:43


0.4 MG


 


 Sodium Chloride  100 ml  ONCE  ONCE


 IV  22 14:45


 22 14:46 DC 22 14:46


80 ML








Vital Signs/I&O











 22





 13:13


 


Temp 36.9


 


Pulse 67


 


Resp 18


 


B/P (MAP) 147/89 (108)


 


Pulse Ox 97











Departure


Communication (Admissions)


NAME:   ANIBAL YOUNGBLOOD


[facility] REC#:   [id number]


ACCOUNT#:   [id number]


PT STATUS:   REG ER


:   1965


PHYSICIAN:   JOSE MASTERSON APRALEM


ADMIT DATE:   22/ER


                                   ***Draft***


Date of Exam:22





CT ANGIO CHEST W








EXAMINATION: CT angiography of the chest.





TECHNIQUE: Contrast enhanced thin section helical images were


obtained through the chest with intravenous contrast timed for


the optimal opacification of the arterial structures per CTA


protocol. Post-processing, reconstructions and interpretation of


angiographic images of the vessels was performed. 3D MIP


reconstructions were performed and reviewed. All CT scans use one


or more of the following dose optimizing techniques: Automated


exposure control, MA and/or KvP adjustment based on a patient


size and exam type, or iterative reconstruction. 





HISTORY: Chest pain and elevated D-dimer.





COMPARISON: None available.





FINDINGS: 





There is no pulmonary embolism.





There is no edema or pneumonia. No pleural effusion. No


pneumothorax. No suspicious nodules.





There is no axillary or supraclavicular lymphadenopathy. There is


no mediastinal lymphadenopathy. Heart size is normal. There are


mild coronary artery calcifications. No pericardial effusion.


Aorta is normal in caliber.





Limited views of the upper abdomen are unremarkable.





There are no suspicious osseous lesions.





IMPRESSION:





1. No pulmonary embolism, clear lungs.





  Dictated on workstation # [id number]








Dict:   22 1449


Trans:   22 1453


 [id number]





Interpreted by:     MARCELO IVORY MD


Electronically signed by:


1441-the 2 nitroglycerin temporarily took her pain from a 3 out of 10 down to 1 

out of 10 though it has returned at this time.  Its only a 3 out of 10.  I 

offered her something more for pain though she states she does not want anything

else at this time.  D-dimer is a little elevated so we will get a CT angio 

chest.


1524-spoke with Dr. Lopez and Dr. HICKEY.  Given the ongoing chest pain will 

admit.  Observation status, serial troponins.





Impression





   Primary Impression:  


   Chest pain


Disposition:   ADMITTED AS INPATIENT


Condition:  Stable





Admissions


Decision to Admit Reason:  Admit from ER (General)


Decision to Admit/Date:  2022


Time/Decision to Admit Time:  15:24





Departure-Patient Inst.


Referrals:  


HAYLEY HICKEY DO (PCP/Family)


Primary Care Physician











JOSE MASTERSON             2022 13:43

## 2022-04-19 NOTE — HISTORY & PHYSICAL
History of Present Illness


History of Present Illness


Reason for visit/HPI


This is a 57 year old female who was at work eating lunch when she dropped her 

parmesan cheese packet on the floor and bent over to pick it up and had the 

onset of left sided substernal chest pain.  The pain was initially sharp and 

then remained as a dull pain.  It did not radiate and she had no associated 

shortness of air, no nausea and no diaphoresis.  She presented to the emergency 

room and her EKG showed no acute ST segment changes and her cardiac enzymes were

negative.  She was given sublingual nitro which really didn't change her pain.  

She has a history of coronary artery disease with stent placement in 2019.  She 

also has a strong family history of CAD.  She continues to smoke.  Due to her 

history with ongoing chest discomfort it was decided to admit her for further 

evaluation and treatment.


Date of Admission


2022 at 15:22


Date Seen by a Provider:  2022


Time Seen by a Provider:  18:21


I consulted on this patient on


22


 18:21


Attending Physician


Hayley Linder DO


Admitting Physician


Hayley Linder DO


Consult


Cardiology--Dr. Parra





Allergies and Home Medications


Allergies


Coded Allergies:  


     Sulfa (Sulfonamide Antibiotics) (Unverified  Allergy, Unknown, 1/14/15)


     nitrofurantoin (Verified  Allergy, Unknown, 22)


   rash over torso





Patient Home Medication List


Home Medication List Reviewed:  Yes


Amlodipine Besylate (Norvasc) 5 Mg Tablet, 5 MG PO DAILY, (Reported)


   Entered as Reported by: Jane Alejandre on 22 1638


   Last Action: Reviewed


Aspirin (Aspirin) 81 Mg Tab.chew, 81 MG PO DAILY


   Prescribed by: HAYLEY LINDER on 19 1232


   Last Action: Reviewed


Atorvastatin Calcium (Atorvastatin Calcium) 80 Mg Tablet, 80 MG PO HS


   Prescribed by: HAYLEY LINDER on 19 1232


   Last Action: Reviewed


Cetirizine HCl (Zyrtec) 10 Mg Tablet, 10 MG PO DAILY PRN for ALLERGIES, 

(Reported)


   Entered as Reported by: NIDHI WADDELL on 19 1030


   Last Action: Reviewed


Clopidogrel Bisulfate (Clopidogrel) 75 Mg Tablet, 75 MG PO DAILY


   Prescribed by: HAYLEY LINDER on 19 1232


   Last Action: Reviewed


Famotidine (Famotidine) 20 Mg Tablet, 20 MG PO BID


   Prescribed by: HAYLEY LINDER on 19 1232


   Last Action: Reviewed


Metoprolol Succinate (Metoprolol Succinate) 50 Mg Tab.er.24h, 50 MG PO DAILY


   Prescribed by: HAYLEY LINDER on 19 1232


   Last Action: Reviewed


Paroxetine HCl (Paroxetine HCl) 20 Mg Tablet, 20 MG PO DAILY, (Reported)


   Entered as Reported by: Jane Alejandre on 22 1638


   Last Action: Reviewed


Discontinued Medications


Nitrofurantoin Monohyd/M-Cryst (Macrobid 100 mg Capsule) 100 Mg Capsule, 1 TAB 

PO BID


   Discontinued Reason: No Longer Taking


   Prescribed by: FERN HERRERA on 19 1624


   Last Action: Discontinued





Past Medical-Social-Family Hx


Patient Social History


Marrital Status:  


Employed/Student:  employed


Tobacco Use?:  Yes


Tobacco type used:  Cigarettes


Smoking Status:  Current Everyday Smoker


Substance use?:  No


Alcohol Use?:  Yes


Alcohol type:  Beer, Wine


Alcohol Frequency:  Rarely


Pt feels they are or have been:  No





Immunizations Up To Date


First/Initial COVID19 Vaccinat:  


Second COVID19 Vaccination [place]:  





Seasonal Allergies


Seasonal Allergies:  Yes





Current Status


Pregnancy status:  No


Advance Directives:  No


Communicates:  Verbally


Primary Language:  English


Preferred Spoken Language:  English


Is interpretation needed?:  No


Implanted or Applied Medical D:  None





Past Medical History


Surgeries:  Appendectomy,  Section, Hysterectomy, Oophorectomy, 

Orthopedic, Tonsillectomy


High Cholesterol, Hypertension


GYN History:  Hysterectomy


Polyps


Anxiety


Blood Disorders:  No





Family Medical History





Cardiovascular disease


  19 MOTHER, Onset:50's - 60


FH: atrial fibrillation


  G8 BROTHER, Onset:Unknown


Glaucoma


Hypercholesterolemia


  G8 SISTER, Onset:Unknown


Hypertension


  G8 SISTER, Onset:Unknown


Kidney disease


  19 FATHER, , Onset:60 years & older





Review of Systems


Constitutional:  No no symptoms reported, No see HPI, No chills, No diaphoresis,

No dizziness, No fever, No malaise, No weakness, No weight gain, No weight loss,

No other


EENTM:  No see HPI, No no symptoms reported, No ear discharge, No hearing loss, 

No ear pain, No blurred vision, No double vision, No eye pain, No tearing, No 

vision loss, No dental problems, No hoarseness, No mouth pain, No mouth swel

ling, No epistaxis, No nose congestion, No nose pain, No throat pain, No throat 

swelling, No other


Respiratory:  No no symptoms reported, No see HPI, No cough, No dyspnea on 

exertion, No hemoptysis, No orthopnea, No phlegm, No short of breath, No 

stridor, No wheezing, No other


Cardiovascular:  chest pain, vascular heart diseas


Gastrointestinal:  diarrhea (IBS)


Genitourinary:  No no symptoms reported, No see HPI, No decreased output, No 

discharge, No dysuria, No frequency, No hematuria, No hesitancy, No 

incontinence, No nocturia, No pain, No other


Musculoskeletal:  No no symptoms reported, No see HPI, No back pain, No gout, No

joint pain, No joint swelling, No muscle pain, No muscle stiffness, No muscle 

cramps, No muscle twitching, No muscle weakness, No neck pain, No other


Skin:  No no symptoms reported, No see HPI, No change in color, No change in 

hair/nails, No dryness, No hx of skin cancer, No lesions, No lumps, No pruritus,

No rash, No other


Psychiatric/Neurological:  Anxiety





Physical Exam


Vital Signs





Vital Signs - First Documented








 22





 13:13 16:05


 


Temp 36.9 


 


Pulse 67 


 


Resp 18 


 


B/P (MAP) 147/89 (108) 


 


Pulse Ox 97 


 


O2 Delivery  Room Air





Capillary Refill : Less Than 3 Seconds


Height, Weight, BMI


Height: 5'3"


Weight: 140lbs. oz. 63.322614wg; 24.72 BMI


Method:Stated


General Appearance:  No Apparent Distress


HEENT:  Normal ENT Inspection


Neck:  Supple


Respiratory:  Lungs Clear, Decreased Breath Sounds


Cardiovascular:  Regular Rate, Rhythm


Gastrointestinal:  Normal Bowel Sounds, Non Tender, Soft


Rectal:  Deferred


Back:  No CVA Tenderness


Extremity:  Non Tender, No Calf Tenderness, No Pedal Edema


Neurologic/Psychiatric:  Alert, Oriented x3


Skin:  Warm/Dry


Comments


Laboratory Tests


22 13:39: 


White Blood Count 7.0, Red Blood Count 4.10, Hemoglobin 13.6, Hematocrit 40, 

Mean Corpuscular Volume 98, Mean Corpuscular Hemoglobin 33, Mean Corpuscular 

Hemoglobin Concent 34, Red Cell Distribution Width 12.4, Platelet Count 279, 

Mean Platelet Volume 9.6, Immature Granulocyte % (Auto) 0, Neutrophils (%) 

(Auto) 61, Lymphocytes (%) (Auto) 28, Monocytes (%) (Auto) 9, Eosinophils (%) 

(Auto) 2, Basophils (%) (Auto) 0, Neutrophils # (Auto) 4.2, Lymphocytes # (Auto)

1.9, Monocytes # (Auto) 0.6, Eosinophils # (Auto) 0.2, Basophils # (Auto) 0.0, 

Immature Granulocyte # (Auto) 0.0, Prothrombin Time 12.6, INR Comment 0.9, 

Activated Partial Thromboplast Time 32, D-Dimer 1.73H, Sodium Level 142, 

Potassium Level 4.1, Chloride Level 108H, Carbon Dioxide Level 20L, Anion Gap 

14, Blood Urea Nitrogen 9, Creatinine 0.75, Estimat Glomerular Filtration Rate 

93, BUN/Creatinine Ratio 12, Glucose Level 102, Calcium Level 10.0, Corrected 

Calcium 9.8, Magnesium Level 2.0, Total Bilirubin 0.4, Aspartate Amino Transf 

(AST/SGOT) 22, Alanine Aminotransferase (ALT/SGPT) 18, Alkaline Phosphatase 124,

Myoglobin 20.2, Troponin I < 0.028, B-Type Natriuretic Peptide 124.1H, Total 

Protein 7.5, Albumin 4.3


22 17:18: Troponin I < 0.028





Assessment/Plan


Assessment and Plan


1.  Chest Pain--uncertain etiology, repeat cardiac enzymes, consult cardiology, 

give protonix for GI etiology


2.  Hypertension--resume metoprolol


3.  History of CAD with Stent placement in 2019--resume plavix and aspirin and 

atorvastatin


4.  Tobacco Abuse--ongoing, cessation has been advised


5.  Anxiety--stable on paxil





Admission Diagnosis


Admission Status:  Observation





Clinical Quality Measures


AMI/AHF:


ASA po Prior to arrival:  Yes (81MG ASPIRIN AT HOME)











HAYLEY LINDER DO        2022 18:26

## 2022-04-20 VITALS — DIASTOLIC BLOOD PRESSURE: 80 MMHG | SYSTOLIC BLOOD PRESSURE: 129 MMHG

## 2022-04-20 VITALS — DIASTOLIC BLOOD PRESSURE: 74 MMHG | SYSTOLIC BLOOD PRESSURE: 131 MMHG

## 2022-04-20 VITALS — SYSTOLIC BLOOD PRESSURE: 113 MMHG | DIASTOLIC BLOOD PRESSURE: 69 MMHG

## 2022-04-20 LAB
CHOLEST SERPL-MCNC: 178 MG/DL (ref ?–200)
HDLC SERPL-MCNC: 36 MG/DL (ref 40–60)
TRIGL SERPL-MCNC: 149 MG/DL (ref ?–150)
VLDLC SERPL CALC-MCNC: 30 MG/DL (ref 5–40)

## 2022-04-20 RX ADMIN — SODIUM CHLORIDE, SODIUM LACTATE, POTASSIUM CHLORIDE, AND CALCIUM CHLORIDE SCH MLS/HR: 600; 310; 30; 20 INJECTION, SOLUTION INTRAVENOUS at 03:25

## 2022-04-20 NOTE — PROGRESS NOTE
Subjective


Subjective


Date Seen by Provider:  Apr 20, 2022


Time Seen by Provider:  07:15


F/u on pt who presented yesterday with left sided chest pain and a hx of CAD 

with stent placement. Pt reports continued 2/10 left sided chest pain that is 

dull and "feels heavy." She reports feeling well overall, and expresses that she

would like to be D/C home if her tests this morning are normal. She reports a 

mild cough when she wakes up in the morning, but states this is a continued 

symptom from COVID in February. The pt also reports a recent diarrheal IBS flare

that subsided two days ago, from which she feels bloated. Pt denies 

palpitations, N/V/D, and fever at this time.





Review of Systems


General:  No Chills; Fatigue


HEENT:  Head Aches; No Visual Changes


Pulmonary:  No Dyspnea; Cough


Cardiovascular:  Chest Pain; No: Palpitations


Gastrointestinal:  No: Nausea, Vomiting, Abdominal Pain, Diarrhea


Genitourinary:  No Dysuria, No Frequency


Musculoskeletal:  back pain (when she coughs); No: neck pain


Neurological:  No: Weakness, Change in speech





Objective


Exam


Vital Signs





Vital Signs








  Date Time  Temp Pulse Resp B/P (MAP) Pulse Ox O2 Delivery O2 Flow Rate FiO2


 


4/20/22 03:25 36.3 53 20 113/69 (84) 96 Room Air  


 


4/20/22 01:08  61      


 


4/19/22 23:10 36.5 69 20 108/70 (83) 95 Room Air  


 


4/19/22 22:05  64  106/71 (83)    


 


4/19/22 21:05  62  112/72 (85)    


 


4/19/22 20:05  58  112/73 (86)    


 


4/19/22 19:25      Room Air  


 


4/19/22 19:05 35.6 56 18 125/80 (95) 97 Room Air  


 


4/19/22 19:00  57      


 


4/19/22 18:05  54  139/86 (103)    


 


4/19/22 17:35  57  127/82 (97)    


 


4/19/22 17:23  60      


 


4/19/22 17:16      Room Air  


 


4/19/22 17:05  58  147/88 (107)    


 


4/19/22 16:50  63  143/90 (107)    


 


4/19/22 16:35  65  137/87 (104)    


 


4/19/22 16:20  65  151/94 (113)    


 


4/19/22 16:05 36.4 59 18 138/87 (104) 98 Room Air  


 


4/19/22 15:59  56 18 128/73 98   


 


4/19/22 13:13 36.9 67 18 147/89 (108) 97   














I & O 


 


 4/20/22





 07:00


 


Intake Total 1200 ml


 


Balance 1200 ml








General Appearance:  No Apparent Distress, WD/WN


Respiratory:  Lungs Clear, Decreased Breath Sounds


Cardiovascular:  Regular Rate, Rhythm


Gastrointestinal:  Non Tender, Soft


Extremity:  Normal Inspection, Non Tender, No Pedal Edema


Neurologic/Psychiatric:  Alert, Oriented x3, Normal Mood/Affect


Skin:  Normal Color, Warm/Dry





Results


Lab


Laboratory Tests


4/19/22 13:39: 


White Blood Count 7.0, Red Blood Count 4.10, Hemoglobin 13.6, Hematocrit 40, 

Mean Corpuscular Volume 98, Mean Corpuscular Hemoglobin 33, Mean Corpuscular 

Hemoglobin Concent 34, Red Cell Distribution Width 12.4, Platelet Count 279, 

Mean Platelet Volume 9.6, Immature Granulocyte % (Auto) 0, Neutrophils (%) 

(Auto) 61, Lymphocytes (%) (Auto) 28, Monocytes (%) (Auto) 9, Eosinophils (%) 

(Auto) 2, Basophils (%) (Auto) 0, Neutrophils # (Auto) 4.2, Lymphocytes # (Auto)

1.9, Monocytes # (Auto) 0.6, Eosinophils # (Auto) 0.2, Basophils # (Auto) 0.0, 

Immature Granulocyte # (Auto) 0.0, Prothrombin Time 12.6, INR Comment 0.9, 

Activated Partial Thromboplast Time 32, D-Dimer 1.73H, Sodium Level 142, 

Potassium Level 4.1, Chloride Level 108H, Carbon Dioxide Level 20L, Anion Gap 

14, Blood Urea Nitrogen 9, Creatinine 0.75, Estimat Glomerular Filtration Rate 

93, BUN/Creatinine Ratio 12, Glucose Level 102, Calcium Level 10.0, Corrected 

Calcium 9.8, Magnesium Level 2.0, Total Bilirubin 0.4, Aspartate Amino Transf 

(AST/SGOT) 22, Alanine Aminotransferase (ALT/SGPT) 18, Alkaline Phosphatase 124,

Myoglobin 20.2, Troponin I < 0.028, B-Type Natriuretic Peptide 124.1H, Total 

Protein 7.5, Albumin 4.3


4/19/22 17:18: Troponin I < 0.028


4/20/22 05:26: 


Troponin I < 0.028, Triglycerides Level 149, Cholesterol Level 178, LDL 

Cholesterol Direct 122, VLDL Cholesterol 30, HDL Cholesterol 36L


Radiology


CT ANGIO CHEST W








EXAMINATION: CT angiography of the chest.





TECHNIQUE: Contrast enhanced thin section helical images were


obtained through the chest with intravenous contrast timed for


the optimal opacification of the arterial structures per CTA


protocol. Post-processing, reconstructions and interpretation of


angiographic images of the vessels was performed. 3D MIP


reconstructions were performed and reviewed. All CT scans use one


or more of the following dose optimizing techniques: Automated


exposure control, MA and/or KvP adjustment based on a patient


size and exam type, or iterative reconstruction. 





HISTORY: Chest pain and elevated D-dimer.





COMPARISON: None available.





FINDINGS: 





There is no pulmonary embolism.





There is no edema or pneumonia. No pleural effusion. No


pneumothorax. No suspicious nodules.





There is no axillary or supraclavicular lymphadenopathy. There is


no mediastinal lymphadenopathy. Heart size is normal. There are


mild coronary artery calcifications. No pericardial effusion.


Aorta is normal in caliber.





Limited views of the upper abdomen are unremarkable.





There are no suspicious osseous lesions.





IMPRESSION:





1. No pulmonary embolism, clear lungs.





Assessment/Plan


Assessment/Plan


Assessment and Plan


Left-sided Chest Pain


Hx CAD with stent placement


HTN- resolved


Cough- had COVID in February


Anxiety- stable on paxil


Tobacco Abuse





EKG 4/20-- sinus rhythm, abnormal R prog (possibly due to lead placement), 

borderline T abn in ant lead





MPI this morning per Dr. Parra


Continue serial troponins


Continue Plavix, ASA, and atorvastatin


Nicotine patch yesterday





Clinical Quality Measures


AMI/AHF:


ASA po Prior to arrival:  Yes (81MG ASPIRIN AT HOME)





Supervisory-Addendum Brief


Verification & Attestation


Participated in pt care:  history, physical


Personally performed:  exam, history, supervision of care


Care discussed with:  Medical Student


Procedures:  n/a


Results interpretation:  Verified all documentation


Patient seen and evaluated.  Still with dull left upper chest pain.  There is 

some tenderness in that area over the rib/chest wall on palpation.  Plan is for 

stress test this morning.  If that is normal will treat for musculoskeletal 

etiology with toradol and see if resolves unless cardiology feels needs cardiac 

cath.











IVON PEREZ                    Apr 20, 2022 07:15


HAYLEY HICKEY DO        Apr 20, 2022 08:40

## 2022-04-20 NOTE — PROGRESS NOTE - CARDIOLOGY
Cardiology SOAP Progress Note


Subjective:


Sitting up in chair at the bedside


LACW discomfort unchanged from before


No c/o SOB or palpitations





Objective:


I&O/Vital Signs











 4/19/22 4/19/22 4/20/22 4/20/22





 22:05 23:10 01:08 03:25


 


Temp  36.5  36.3


 


Pulse 64 69 61 53


 


Resp  20  20


 


B/P (MAP) 106/71 (83) 108/70 (83)  113/69 (84)


 


Pulse Ox  95  96


 


O2 Delivery  Room Air  Room Air


 


    





 4/20/22 4/20/22 4/20/22 





 07:00 08:02 08:15 


 


Pulse 61 55  


 


Resp  16  


 


B/P (MAP)  129/80 (96)  


 


Pulse Ox  97  


 


O2 Delivery  Room Air Room Air 














 4/20/22





 00:00


 


Intake Total 200 ml


 


Balance 200 ml








Weight (Pounds):  140


Weight (Calculated Kilograms):  63.209310


Constitutional:  AAO x 3, well-developed, well-nourished


Respiratory:  No accessory muscle use, No respiratory distress; chest expansion 

is symmetric, chest is bilaterally symmetric, lungs clear to auscultation


Cardiovascular:  regular rate-rhythm; No JVD; S1 and S2


Gastrointestional:  No tender; soft, round


Extremities:  no lower extremity edema bilateral


Neurologic/Psychiatric:  grossly intact (moves all extremities)


Skin:  No rash on exposed areas, No ulcerations on exposed areas





Results/Procedures:


Labs


Laboratory Tests


4/19/22 13:39: 


White Blood Count 7.0, Red Blood Count 4.10, Hemoglobin 13.6, Hematocrit 40, 

Mean Corpuscular Volume 98, Mean Corpuscular Hemoglobin 33, Mean Corpuscular 

Hemoglobin Concent 34, Red Cell Distribution Width 12.4, Platelet Count 279, 

Mean Platelet Volume 9.6, Immature Granulocyte % (Auto) 0, Neutrophils (%) 

(Auto) 61, Lymphocytes (%) (Auto) 28, Monocytes (%) (Auto) 9, Eosinophils (%) 

(Auto) 2, Basophils (%) (Auto) 0, Neutrophils # (Auto) 4.2, Lymphocytes # (Auto)

1.9, Monocytes # (Auto) 0.6, Eosinophils # (Auto) 0.2, Basophils # (Auto) 0.0, 

Immature Granulocyte # (Auto) 0.0, Prothrombin Time 12.6, INR Comment 0.9, 

Activated Partial Thromboplast Time 32, D-Dimer 1.73H, Sodium Level 142, 

Potassium Level 4.1, Chloride Level 108H, Carbon Dioxide Level 20L, Anion Gap 

14, Blood Urea Nitrogen 9, Creatinine 0.75, Estimat Glomerular Filtration Rate 

93, BUN/Creatinine Ratio 12, Glucose Level 102, Calcium Level 10.0, Corrected C

alcium 9.8, Magnesium Level 2.0, Total Bilirubin 0.4, Aspartate Amino Transf 

(AST/SGOT) 22, Alanine Aminotransferase (ALT/SGPT) 18, Alkaline Phosphatase 124,

Myoglobin 20.2, Troponin I < 0.028, B-Type Natriuretic Peptide 124.1H, Total 

Protein 7.5, Albumin 4.3


4/19/22 17:18: Troponin I < 0.028


4/20/22 05:26: 


Troponin I < 0.028, Triglycerides Level 149, Cholesterol Level 178, LDL 

Cholesterol Direct 122, VLDL Cholesterol 30, HDL Cholesterol 36L





Laboratory Tests


4/19/22 13:39











A/P:


Assessment:


Chest pain 


- undetermined etiology





CAD.


- Ac NSTEMI, treated with primary PCI in the late hours of 11/5/19.


- Card cath and PCI of 11/5/19: Coronary artery disease consisting of proximal 

occlusion of a small caliber high diagonal branch of the left anterior 

descending to which successful balloon angioplasty was carried out with Emerge 

1.5 x 8 mm balloon and which restored normal antegrade flow. The left anterior 

descending had 70% mid vessel stenosis to which successful stenting was carried 

out with Adriano 2.0 x 15 mm stent with good results. The rest of the coronary 

vessels have mild plaques. Elevated left ventricular end-diastolic pressure. 

Well preserved global left ventricular systolic function with an ejection 

fraction of approximately 60%.


- MPI of Aug 25, 2020 indicated an anteroapical infarction with a small amt of 

antonio-infarct ischemia which fits with her known CAD, no c/o CP at this time, 

localized anteroapical hypokinesis to akinesis. LVEF 52%





Chronic tobacco use


- cessation advised





Strong fam h/o premature CAD





Hypertension





Hyperlipidemia


- tansaminase elevation with doses higher than atorvastatin 40 mg daily) - 

followed by Dr. Linder





Carotid dz


- minimal on carotid u/s of Feb 2020


Plan:


Chest pain of undetermined etiology


- no evidence of ACS 


Continue home medications


Add PPI


Unable to accommodate stress test today per radiology


Spoke at length with patient, she wishes to go home and have stress test done as

an out patient


We have advised her that if she has any changes or worsening of her chest 

discomfort to return to the ED


She verbalizes understanding





Clinical Quality Measures


AMI/AHF:


ASA po Prior to arrival:  Yes (81MG ASPIRIN AT HOME)











JED ZAMBRANO           Apr 20, 2022 09:45

## 2022-04-20 NOTE — PROGRESS NOTE - CARDIOLOGY
Cardiology SOAP Progress Note


Subjective:


Continuous, vague, mild, left upper  chest discomfort


No other discomfort


No shortness of breath or palp or syncope


No focal weakness


No n/v/d





Objective:


I&O/Vital Signs











 4/20/22 4/20/22 4/20/22 4/20/22





 03:25 07:00 08:02 08:15


 


Temp 36.3   


 


Pulse 53 61 55 


 


Resp 20  16 


 


B/P (MAP) 113/69 (84)  129/80 (96) 


 


Pulse Ox 96  97 


 


O2 Delivery Room Air  Room Air Room Air


 


    





 4/20/22   





 12:33   


 


Temp 37.1   


 


Pulse 58   


 


Resp 18   


 


B/P (MAP) 131/74 (93)   


 


Pulse Ox 97   


 


O2 Delivery Room Air   














 4/20/22





 00:00


 


Intake Total 200 ml


 


Balance 200 ml








Weight (Pounds):  140


Weight (Calculated Kilograms):  63.467704


Constitutional:  AAO x 3, well-developed, well-nourished


Respiratory:  No accessory muscle use, No respiratory distress; chest expansion 

is symmetric, chest is bilaterally symmetric, lungs clear to auscultation


Cardiovascular:  regular rate-rhythm; No JVD; S1 and S2


Gastrointestional:  No tender; soft, round


Extremities:  no lower extremity edema bilateral


Neurologic/Psychiatric:  grossly intact (moves all extremities)


Skin:  No rash on exposed areas, No ulcerations on exposed areas





Results/Procedures:


Labs


Laboratory Tests


4/19/22 17:18: Troponin I < 0.028


4/20/22 05:26: 


Troponin I < 0.028, Triglycerides Level 149, Cholesterol Level 178, LDL 

Cholesterol Direct 122, VLDL Cholesterol 30, HDL Cholesterol 36L





Laboratory Tests


4/19/22 13:39











A/P:


Assessment:


Chest discomfort, mild, continuous 


- undetermined etiology





CAD.


- Ac NSTEMI, treated with primary PCI in the late hours of 11/5/19.


- Card cath and PCI of 11/5/19: Coronary artery disease consisting of proximal 

occlusion of a small caliber high diagonal branch of the left anterior 

descending to which successful balloon angioplasty was carried out with Emerge 

1.5 x 8 mm balloon and which restored normal antegrade flow. The left anterior 

descending had 70% mid vessel stenosis to which successful stenting was carried 

out with Adriano 2.0 x 15 mm stent with good results. The rest of the coronary 

vessels have mild plaques. Elevated left ventricular end-diastolic pressure. 

Well preserved global left ventricular systolic function with an ejection 

fraction of approximately 60%.


- MPI of Aug 25, 2020 indicated an anteroapical infarction with a small amt of 

antonio-infarct ischemia which fits with her known CAD, no c/o CP at this time, 

localized anteroapical hypokinesis to akinesis. LVEF 52%





Chronic tobacco use


- cessation advised





Strong fam h/o premature CAD





Hypertension





Hyperlipidemia


- tansaminase elevation with doses higher than atorvastatin 40 mg daily) - 

followed by Dr. Linder





Carotid dz


- minimal on carotid u/s of Feb 2020


Plan:





No evidence of ACS 


Unable to accommodate stress test - logistic barriers


Spoke at length with patient, she wishes to go home and have stress test done as

an out patient


We have advised her that if she has any changes or worsening of her chest 

discomfort or new symptoms, she is to return to the ED


She verbalizes understanding





Clinical Quality Measures


AMI/AHF:


ASA po Prior to arrival:  Yes (81MG ASPIRIN AT HOME)











DANAY PARDO MD FACP FACC CCDS   Apr 20, 2022 14:23

## 2022-04-22 ENCOUNTER — HOSPITAL ENCOUNTER (OUTPATIENT)
Dept: HOSPITAL 75 - CARD | Age: 57
End: 2022-04-22
Attending: NURSE PRACTITIONER
Payer: COMMERCIAL

## 2022-04-22 VITALS — HEIGHT: 62.99 IN | WEIGHT: 136.69 LBS | BODY MASS INDEX: 24.22 KG/M2

## 2022-04-22 VITALS — DIASTOLIC BLOOD PRESSURE: 88 MMHG | SYSTOLIC BLOOD PRESSURE: 139 MMHG

## 2022-04-22 DIAGNOSIS — I25.10: Primary | ICD-10-CM

## 2022-04-22 PROCEDURE — 93017 CV STRESS TEST TRACING ONLY: CPT

## 2022-04-22 PROCEDURE — 78452 HT MUSCLE IMAGE SPECT MULT: CPT

## 2022-04-22 NOTE — STRESS TEST
DATE OF SERVICE:  04/22/2022



RESTING AND POST REGADENOSON TECHNETIUM-99M TETROFOSMIN SPECT CT IMAGING



ORDERING PHYSICIAN:

DEBRA Quintero.



PRIMARY PHYSICIAN:

Dr. Linder.



CLINICAL DIAGNOSIS:

Coronary artery disease.



Baseline images were carried out after injection of 10.41 mCi of technetium-99m

Tetrofosmin.  This was followed by 0.4 mg of Regadenoson and 28.8 mCi of

technetium-99m Tetrofosmin for stress imaging.  The electrocardiogram showed

sinus rhythm at baseline.  There is evidence of old anterolateral myocardial

infarction on the electrocardiogram.  There is nonspecific subtle ST and T-wave

abnormality.  The electrocardiogram did not change significantly with the

Regadenoson infusion.  The patient noted nausea and a hot feeling, which

resolved in a few minutes.  Review of images at rest and following stress

indicates an anteroapical perfusion defect that appears to be partially

transient.  Gated images show anteroapical hypokinesis to akinesis.  Left

ventricular ejection fraction is calculated to be 59%.



CONCLUSIONS:

1.  This study is indicative of an anteroapical myocardial infarction with a

moderate amount of ischemia _____.

2.  Localized anteroapical hypokinesis to akinesis.

3.  Well preserved global left ventricular systolic function with an ejection

fraction of 59%.





Job ID: [id number]

DocumentID: [id number]

Dictated Date:  04/22/2022 14:48:03

Transcription Date: 04/22/2022 17:05:20

Dictated By: DANAY PARDO MD, MA, FACP, FACC,

## 2022-05-03 ENCOUNTER — HOSPITAL ENCOUNTER (OUTPATIENT)
Dept: HOSPITAL 75 - CATH | Age: 57
Discharge: HOME | End: 2022-05-03
Attending: INTERNAL MEDICINE
Payer: COMMERCIAL

## 2022-05-03 VITALS — DIASTOLIC BLOOD PRESSURE: 79 MMHG | SYSTOLIC BLOOD PRESSURE: 132 MMHG

## 2022-05-03 VITALS — DIASTOLIC BLOOD PRESSURE: 78 MMHG | SYSTOLIC BLOOD PRESSURE: 126 MMHG

## 2022-05-03 VITALS — SYSTOLIC BLOOD PRESSURE: 117 MMHG | DIASTOLIC BLOOD PRESSURE: 71 MMHG

## 2022-05-03 VITALS — SYSTOLIC BLOOD PRESSURE: 136 MMHG | DIASTOLIC BLOOD PRESSURE: 77 MMHG

## 2022-05-03 VITALS — DIASTOLIC BLOOD PRESSURE: 78 MMHG | SYSTOLIC BLOOD PRESSURE: 101 MMHG

## 2022-05-03 VITALS — DIASTOLIC BLOOD PRESSURE: 70 MMHG | SYSTOLIC BLOOD PRESSURE: 120 MMHG

## 2022-05-03 VITALS — BODY MASS INDEX: 24.73 KG/M2 | WEIGHT: 139.55 LBS | HEIGHT: 62.99 IN

## 2022-05-03 VITALS — DIASTOLIC BLOOD PRESSURE: 68 MMHG | SYSTOLIC BLOOD PRESSURE: 121 MMHG

## 2022-05-03 VITALS — DIASTOLIC BLOOD PRESSURE: 95 MMHG | SYSTOLIC BLOOD PRESSURE: 151 MMHG

## 2022-05-03 DIAGNOSIS — I10: ICD-10-CM

## 2022-05-03 DIAGNOSIS — Z79.899: ICD-10-CM

## 2022-05-03 DIAGNOSIS — R94.39: ICD-10-CM

## 2022-05-03 DIAGNOSIS — I65.23: ICD-10-CM

## 2022-05-03 DIAGNOSIS — I25.10: Primary | ICD-10-CM

## 2022-05-03 DIAGNOSIS — F17.210: ICD-10-CM

## 2022-05-03 DIAGNOSIS — E78.2: ICD-10-CM

## 2022-05-03 LAB
ALBUMIN SERPL-MCNC: 4.3 GM/DL (ref 3.2–4.5)
ALP SERPL-CCNC: 116 U/L (ref 40–136)
ALT SERPL-CCNC: 16 U/L (ref 0–55)
APTT BLD: 31 SEC (ref 24–35)
BILIRUB SERPL-MCNC: 0.5 MG/DL (ref 0.1–1)
BUN/CREAT SERPL: 9
CALCIUM SERPL-MCNC: 9.9 MG/DL (ref 8.5–10.1)
CHLORIDE SERPL-SCNC: 105 MMOL/L (ref 98–107)
CHOLEST SERPL-MCNC: 242 MG/DL (ref ?–200)
CO2 SERPL-SCNC: 22 MMOL/L (ref 21–32)
CREAT SERPL-MCNC: 0.79 MG/DL (ref 0.6–1.3)
GFR SERPLBLD BASED ON 1.73 SQ M-ARVRAT: 88 ML/MIN
GLUCOSE SERPL-MCNC: 110 MG/DL (ref 70–105)
HCT VFR BLD CALC: 44 % (ref 35–52)
HDLC SERPL-MCNC: 50 MG/DL (ref 40–60)
HGB BLD-MCNC: 14.6 G/DL (ref 11.5–16)
INR PPP: 0.9 (ref 0.8–1.4)
MCH RBC QN AUTO: 33 PG (ref 25–34)
MCHC RBC AUTO-ENTMCNC: 34 G/DL (ref 32–36)
MCV RBC AUTO: 99 FL (ref 80–99)
PLATELET # BLD: 311 10^3/UL (ref 130–400)
PMV BLD AUTO: 9.7 FL (ref 9–12.2)
POTASSIUM SERPL-SCNC: 4.2 MMOL/L (ref 3.6–5)
PROT SERPL-MCNC: 7.9 GM/DL (ref 6.4–8.2)
PROTHROMBIN TIME: 12.6 SEC (ref 12.2–14.7)
SODIUM SERPL-SCNC: 142 MMOL/L (ref 135–145)
TRIGL SERPL-MCNC: 156 MG/DL (ref ?–150)
VLDLC SERPL CALC-MCNC: 31 MG/DL (ref 5–40)
WBC # BLD AUTO: 7.8 10^3/UL (ref 4.3–11)

## 2022-05-03 PROCEDURE — 85027 COMPLETE CBC AUTOMATED: CPT

## 2022-05-03 PROCEDURE — 93458 L HRT ARTERY/VENTRICLE ANGIO: CPT

## 2022-05-03 PROCEDURE — 93005 ELECTROCARDIOGRAM TRACING: CPT

## 2022-05-03 PROCEDURE — 85610 PROTHROMBIN TIME: CPT

## 2022-05-03 PROCEDURE — 87081 CULTURE SCREEN ONLY: CPT

## 2022-05-03 PROCEDURE — 36415 COLL VENOUS BLD VENIPUNCTURE: CPT

## 2022-05-03 PROCEDURE — 85730 THROMBOPLASTIN TIME PARTIAL: CPT

## 2022-05-03 PROCEDURE — 80061 LIPID PANEL: CPT

## 2022-05-03 PROCEDURE — 93567 NJX CAR CTH SPRVLV AORTGRPHY: CPT

## 2022-05-03 PROCEDURE — 80053 COMPREHEN METABOLIC PANEL: CPT

## 2022-05-03 NOTE — CARDIAC PROCEDURE NOTE-CS/ASA
Pre-Procedure Note


Pre-Op Procedure Note


H&P Reviewed


The H&P was reviewed, patient examined and no changes noted.


Date H&P Reviewed:  May 3, 2022


Time H&P Reviewed:  12:30





Conscious Sedation Pre-Proced


Time


12:30





ASA Score


3


For ASA 3 and 4: Consider anesthesia and medical clearance. Also, for patients

with a history of failed moderate sedation consider anesthesia.

















Airway 


 


Lungs 


 


Heart 


 


 ASA score


 


 ASA 1: a normal healthy patient


 


 ASA 2:  a patient with a mild systemic disease (mid diabetes, controlled 

hypertension, obesity 


 


 ASA 3:  a patient with a severe systemic disease that limits activity  (angina,

COPD, prior Myocardial infarction)


 


 ASA 4:  a patient with an incapacitating disease that is a constant threat to 

life (CHF, renal failure)


 


 ASA 5:  a moribund patient not expected to survive 24 hrs.  (ruptured aneurysm)


 


 ASA 6:  a declared brain-dead patient whose organs are being harvested.


 


 For emergent operations, add the letter E after the classification











Mallampati Classification


Grade 2





Sedation Plan


Analgesia, Amnesia, Plan communicated to team members, Discussed options with 

patient/fam, Discussed risks with patient/fam


The patient is an appropriate candidate to undergo the planned procedure, 

sedation, and anesthesia.





The patient immediately re-assessed prior to indication.











DANAY PARDO MD FACP FAC CCDS    May 3, 2022 13:15

## 2022-05-03 NOTE — CARDIAC CATHETERIZATION
DATE OF SERVICE:  05/03/2022



CARDIAC CATHETERIZATION REPORT



The patient is a 57-year-old lady with known coronary artery disease, who has

previously had stenting of the mid left anterior descending and balloon

angioplasty of a diagonal.  She underwent a myocardial perfusion imaging study,

which has shown localized anterolateral infarction with considerable ischemia. 

Cardiac catheterization was, therefore, recommended and informed consent was

obtained.



DESCRIPTION OF PROCEDURE:

She was brought to the cardiac catheterization laboratory in a fasting state. 

Right groin was prepared and draped in the usual sterile fashion.  Lidocaine 1%

was used for local anesthesia.  Modified Seldinger technique was used to advance

a 5-French sheath in right femoral artery angiography.  Angiography of the right

femoral artery was carried out through the sheath.  We used 5-French JL4

catheter for left coronary angiography.  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

anomalous origin and multiple catheters were used to try and engage it, but none

result in selective engagement.  Nevertheless, we obtained adequate views with

nonselective engagement with multiple catheters and with an aortic root

angiogram.  A 5-French pigtail catheter was used to carry out left heart

catheterization, left ventricular angiography.  The pigtail was pulled back to

the aortic root and aortic root angiography was also performed.  The catheter

was removed and Mynx was used to achieve hemostasis following sheath removal.



HEMODYNAMICS:

Left ventricular end-diastolic pressure following coronary angiography was 21

mmHg.  There was no significant pressure gradient on pullback across the aortic

valve.  Ascending aortic pressure was 127/73 with a mean of 95 mmHg.



LEFT VENTRICULAR ANGIOGRAPHY:

Left ventricular angiography was carried out in the right anterior oblique

projection.  There is localized anterolateral hypokinesis to akinesis.  Left

ventricular ejection fraction is approximately 50 to 55%.



CORONARY ANGIOGRAPHY:

Left main coronary artery is free of significant disease.  Left anterior

descending artery has a widely patent stent in its mid portion.  Mild plaque is

seen involving the left anterior descending and the diagonal.  The left

circumflex artery has approximately 40% mid vessel stenosis.  The right coronary

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  It is a dominant vessel and does not

exhibit significant obstructive disease.



CONCLUSIONS:

1.  Relatively mild coronary artery disease.

2.  Patent stent in the mid left anterior descending and approximately 40% mid

vessel stenosis of the left circumflex.  An anomalous right coronary artery is

dominant and does not exhibit significant disease.

3.  Elevated left ventricular end-diastolic pressure.

4.  Localized anterolateral hypokinesis to akinesis.

5.  Well preserved global left ventricular systolic function with ejection

fraction of 50 to 55%.



DISCUSSION AND RECOMMENDATIONS:

Based on results of the study, it appears appropriate to continue a conservative

approach.  Risk factor modification has been reviewed.  Current regimen is being

continued and she has been advised for outpatient followup in the next one or

two weeks for further adjustment, as needed.





Job ID: [id number]

DocumentID: [id number]

Dictated Date:  05/03/2022 13:37:59

Transcription Date: 05/03/2022 14:37:08

Dictated By: DANAY PARDO MD, MA, FACP, FACC,

## 2022-05-03 NOTE — DISCHARGE INST-CARDIOLOGY
Discharge Inst-Cardiac


Discharge Medications


Continued Medications:  


Acetaminophen (Tylenol Extra Strength) 500 Mg Tablet


1000 MG PO Q8H PRN for PAIN-MILD (1-4), TAB





Aspirin (Aspirin EC) 81 Mg Tablet.dr


81 MG PO DAILY, TAB





Atorvastatin Calcium (Atorvastatin Calcium) 80 Mg Tablet


80 MG PO DAILY, TAB





Cetirizine HCl (Zyrtec) 10 Mg Tablet


10 MG PO DAILY, TAB





Cholecalciferol (Vitamin D3) (Vitamin D3) 25 Mcg (1000 Unit) Tablet


25 MCG PO DAILY, TAB





Clopidogrel Bisulfate (Clopidogrel) 75 Mg Tablet


75 MG PO Q48H, TAB





Famotidine (Famotidine) 20 Mg Tablet


20 MG PO BID PRN for HEARTBURN, TAB





Metoprolol Succinate (Metoprolol Succinate) 100 Mg Tab.er.24h


100 MG PO DAILY, TAB





Multivitamin (Multivitamin) 1 Each Tablet


1 EACH PO DAILY, TAB





Paroxetine HCl (Paroxetine HCl) 20 Mg Tablet


20 MG PO DAILY, TAB





Varenicline Tartrate (Varenicline Tartrate) 0.5 Mg Tablet


1 MG PO BID, TAB

















DANAY PARDO MD FACP FAC CCDS    May 3, 2022 13:17

## 2022-05-03 NOTE — DISCHARGE INST-POST CATH
Discharge Inst-CATH/EP


Post Cardiac Cath/EP D/C Inst


Follow Up/Plan


F/u with Dr Parra in 2 weeks








ACTIVITY





* Go Home directly and rest.


* Limit activity of the leg (or wrist if it was used) for 7 days including 

aerobics, swimming,


   jogging, bicycling, etc.


* Restrict stair-climbing for 7 days if possible, if not, climb up with your n

on-cath leg, then


   bring together on the same step.


* Avoid lifting, pushing, pulling or excessive movement of the affected ex

tremity for 7 days.


* Customary sexual activity may be resumed after 2 days-use caution not to use a

position  


   that strains or causes pain to the affected extremity.


* No driving for 24 hours.


* NO SMOKING. 


* Avoid straining for bowel movements for 7 days.


* Gentle walking on level ground is allowed.


* Returning to work will depend on the type of procedure and the results. Your 

doctor will discuss


   this with you.





CALL YOUR DOCTOR FOR ANY OF THE FOLLOWING:





*If bleeding from the puncture site occurs- Apply gentle pressure to site with 

clean cloth and call


   your doctor or EMS.


* If a knot or lump forms under the skin, increases in size, or causes pain.


* If bruising appears to be worsening or moving further down your leg instead of

disappearing.


* Temperature above 101 F.





CARE OF YOUR GROIN INCISION;





* Bruising or purple discoloration of the skin near the puncture site is common.


* You may shower only, no bathtub bathing for 5 days.  Be careful to avoid 

slipping as your


   leg may feel stiff.


* If a closure device was used on your femoral artery, please see the attached 

guide regarding


   care of the device and your leg.


* Leave dressing on FOR 24 hours.





CARE OF YOUR WRIST INCISION;





* Bruising or purple discoloration of the skin near the puncture site is common.


* You may shower.


* DO NOT submerge wrist.


* Leave dressing on FOR 24 hours.











DANAY PARRA MD FACP FAC CCDS    May 3, 2022 13:18

## 2023-07-07 ENCOUNTER — HOSPITAL ENCOUNTER (OUTPATIENT)
Dept: HOSPITAL 75 - RAD | Age: 58
End: 2023-07-07
Attending: FAMILY MEDICINE
Payer: COMMERCIAL

## 2023-07-07 DIAGNOSIS — M19.071: Primary | ICD-10-CM

## 2023-07-07 PROCEDURE — 73630 X-RAY EXAM OF FOOT: CPT

## 2023-07-07 PROCEDURE — 73610 X-RAY EXAM OF ANKLE: CPT

## 2023-07-07 NOTE — DIAGNOSTIC IMAGING REPORT
INDICATION: Ankle pain 



EXAMINATION: Right ankle 07/07/2023



FINDINGS:



3 views of the ankle.



There is soft tissue swelling about the ankle. No displaced

fractures or dislocations appreciated. Ankle mortise intact.



IMPRESSION:

1. Soft tissue swelling with no fractures identified. If pain

persists follow-up recommended.



Dictated by: 



  Dictated on workstation # [id number]

## 2023-07-07 NOTE — DIAGNOSTIC IMAGING REPORT
EXAMINATION: Right foot, three views.



HISTORY: Foot pain and swelling.



COMPARISON: None available.



FINDINGS: 



There has been a surgery of the right first metatarsal and

proximal phalanx possibly representing a bunionectomy. There are

screws in the fifth metatarsal. No acute fracture is seen. There

is moderate first metatarsophalangeal osteoarthritis. No acute

fracture.



IMPRESSION:



1. No acute fracture.



2. Moderate first metatarsophalangeal osteoarthritis.



Dictated by: 



  Dictated on workstation # [id number]

## 2023-08-25 ENCOUNTER — HOSPITAL ENCOUNTER (OUTPATIENT)
Dept: HOSPITAL 75 - RAD | Age: 58
End: 2023-08-25
Attending: FAMILY MEDICINE
Payer: COMMERCIAL
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
## 2023-08-25 DIAGNOSIS — S62.102D: Primary | ICD-10-CM

## 2023-08-25 DIAGNOSIS — S52.502D: ICD-10-CM

## 2023-08-25 PROCEDURE — 73110 X-RAY EXAM OF WRIST: CPT

## 2023-08-25 NOTE — DIAGNOSTIC IMAGING REPORT
EXAMINATION: Left wrist 3 or more views



HISTORY: Wrist fracture



COMPARISON: 07/24/2023



FINDINGS: 



There is a healing nondisplaced fracture of left distal radius.

There is a minimally displaced fracture of the ulnar styloid

process. The alignment is unchanged. Joint spaces are normal.



IMPRESSION:



1. Healing nondisplaced fracture of the left distal radius.



2. Minimally displaced fracture of ulnar styloid process

fracture.



3. No change in alignment from prior exam.



Dictated by: 



  Dictated on workstation # [id number]

## 2023-09-01 ENCOUNTER — HOSPITAL ENCOUNTER (OUTPATIENT)
Dept: HOSPITAL 75 - RAD | Age: 58
End: 2023-09-01
Attending: FAMILY MEDICINE
Payer: COMMERCIAL

## 2023-09-01 DIAGNOSIS — Z12.31: Primary | ICD-10-CM

## 2023-09-01 PROCEDURE — 77067 SCR MAMMO BI INCL CAD: CPT

## 2023-09-01 PROCEDURE — 77063 BREAST TOMOSYNTHESIS BI: CPT

## 2023-09-01 NOTE — DIAGNOSTIC IMAGING REPORT
INDICATION: Routine screening.



Comparison is made with prior mammogram from 05/02/2017.



2-D and 3-D bilateral screening mammography was performed with

CAD.



Scattered fibroglandular densities are identified bilaterally.

Nodular densities left breast appear stable and most consistent

benign etiology. No spiculated mass or malignant-appearing

microcalcifications are seen. Axillae are unremarkable.



IMPRESSION:  No mammographic features suspicious for malignancy

are identified.



ACR BI-RADS Category 2: Benign findings.

Result letter will be mailed to the patient.

Note: At least 10% of breast cancer is not imaged by mammography.



BI-RADS Category 2



Dictated by: 



  Dictated on workstation # [id number]